# Patient Record
Sex: MALE | Race: WHITE | NOT HISPANIC OR LATINO | Employment: FULL TIME | URBAN - METROPOLITAN AREA
[De-identification: names, ages, dates, MRNs, and addresses within clinical notes are randomized per-mention and may not be internally consistent; named-entity substitution may affect disease eponyms.]

---

## 2022-12-21 ENCOUNTER — OFFICE VISIT (OUTPATIENT)
Dept: OTOLARYNGOLOGY | Facility: CLINIC | Age: 37
End: 2022-12-21
Payer: COMMERCIAL

## 2022-12-21 ENCOUNTER — PRE VISIT (OUTPATIENT)
Dept: OTOLARYNGOLOGY | Facility: CLINIC | Age: 37
End: 2022-12-21

## 2022-12-21 VITALS
HEIGHT: 68 IN | HEART RATE: 106 BPM | BODY MASS INDEX: 27.43 KG/M2 | SYSTOLIC BLOOD PRESSURE: 126 MMHG | RESPIRATION RATE: 16 BRPM | DIASTOLIC BLOOD PRESSURE: 86 MMHG | OXYGEN SATURATION: 96 % | WEIGHT: 181 LBS | TEMPERATURE: 99.9 F

## 2022-12-21 DIAGNOSIS — J03.90 TONSILLITIS: Primary | ICD-10-CM

## 2022-12-21 PROCEDURE — 99203 OFFICE O/P NEW LOW 30 MIN: CPT | Performed by: OTOLARYNGOLOGY

## 2022-12-21 RX ORDER — PREDNISONE 10 MG/1
40 TABLET ORAL DAILY
Qty: 20 TABLET | Refills: 0 | Status: SHIPPED | OUTPATIENT
Start: 2022-12-21 | End: 2022-12-26

## 2022-12-21 RX ORDER — AZITHROMYCIN 250 MG/1
TABLET, FILM COATED ORAL
COMMUNITY
Start: 2022-12-20 | End: 2023-10-24

## 2022-12-21 ASSESSMENT — PAIN SCALES - GENERAL: PAINLEVEL: MODERATE PAIN (5)

## 2022-12-21 NOTE — LETTER
"12/21/2022       RE: Jose Verdin  [address]  [facility] [address]     Dear Colleague,    Thank you for referring your patient, Jose Verdin, to the [facility] EAR NOSE AND THROAT CLINIC [place] [facility]. Please see a copy of my visit note below.    Went to  yesterday and is negative for Covid, flu, mono, strep but on a z-pack. Feverish at home the past few days but today is 99.5 oral; had 3 aleve this morning.    Neida Jimenez, [facility]                   New Patient Visit      Encounter date: December 21, 2022    Chief Complaint: sore throat, fever    History of Present Illness: Jose Verdin is a pleasant 37-year-old gentleman who I am seeing for sore throat.  He has had several days of fever, right greater than left throat pain, difficulty swallowing, and now nasal congestion.  He has little cough.  He was seen at urgent care yesterday and was negative for strep, mono, flu, and COVID.  He denies difficulty breathing.  No prior episodes of tonsillitis.  No mucopurulent discharge from the nose.  His son recently had flulike illness and is recovering from that currently.        Review of systems: A 14-point review of systems has been conducted and was negative for any notable symptoms, except as dictated in the history of present illness.     No past medical history on file.     No past surgical history on file.     No family history on file.     Social History     Socioeconomic History     Marital status:         Physical Exam:  Vital signs: /86 (BP Location: Right arm, Patient Position: Sitting, Cuff Size: Adult Regular)   Pulse 106   Temp 99.9  F (37.7  C) (Temporal)   Resp 16   Ht 1.727 m (5' 8\")   Wt 82.1 kg (181 lb)   SpO2 96%   BMI 27.52 kg/m     General Appearance: No acute distress, appropriate demeanor, conversant  Eyes: moist conjunctivae; EOMI; pupils " symmetric; visual acuity grossly intact; no proptosis  Head: normocephalic; overall symmetric appearance without deformity  Face: overall symmetric without deformity; HB I/VI  Ears: Normal appearance of external ear; external meatus normal in appearance; TMs intact without perforation bilaterally;   Nose: No external deformity  Oral Cavity/oropharynx: Normal appearance of mucosa; tongue midline; no mass or lesions; tonsils with severe erythema and exudate bilaterally; no evidence of peritonsillar abscess  Neck: RT>LT tender lymphadenopathy  Lungs: symmetric chest rise; no wheezing  CV: Good distal perfusion; normal heart rate  Extremities: No deformity  Neurologic Exam: Cranial nerves II-XII are grossly intact; no focal deficit      Laboratory Review:  n/a    Imaging Review:  n/a    Pathology Review:  n/a    Assessment/Medical Decision Making:  Likely acute tonsillitis    No evidence of PTA or deep space neck infection currently, but we discussed warning signs to watch out for (failure to improve, worsening symptoms (like shortness of breath, trouble swallowing, etc)      Plan:  Switch to augmentin  Prednisone burst  Return if no improement      Josue Nash MD    [facility]  Center for Skull Base and Pituitary Surgery  [facility]  Department of Otolaryngology - Head & Neck Surgery          Again, thank you for allowing me to participate in the care of your patient.      Sincerely,    Josue Nash MD

## 2022-12-21 NOTE — PATIENT INSTRUCTIONS
1. Please follow-up in clinic as needed.   Stop taking Azithromycin and start Augmentin.    Start Prednisone, 40 mg daily for 5 days.   2. Please call the ENT clinic with any questions,concerns, new or worsening symptoms.    -Clinic number is [phone]   - Radha's direct line (Dr. Nash's nurse) [phone]

## 2022-12-21 NOTE — LETTER
Date:December 22, 2022      Patient was self referred, no letter generated. Do not send.        [facility] Health Information

## 2022-12-21 NOTE — PROGRESS NOTES
Went to  yesterday and is negative for Covid, flu, mono, strep but on a z-pack. Feverish at home the past few days but today is 99.5 oral; had 3 aleve this morning.    Neida Jimenez, CMA

## 2022-12-21 NOTE — TELEPHONE ENCOUNTER
FUTURE VISIT INFORMATION      FUTURE VISIT INFORMATION:    Date: 12/21/22    Time: 10:15    Location[place] [facility]  REFERRAL INFORMATION:    Referring provider:      Referring providers clinic:      Reason for visit/diagnosis  Tonsillitis, swollen neck, sinusitis    RECORDS REQUESTED FROM:       Clinic name Comments Records Status Imaging Status   [facility] 12/20/22- note with Saravanan Judd Jr. PASTEPHANIE   Care everywhere

## 2022-12-21 NOTE — PROGRESS NOTES
"                   [facility]                   New Patient Visit      Encounter date: December 21, 2022    Chief Complaint: sore throat, fever    History of Present Illness: Jose Verdin is a pleasant 37-year-old gentleman who I am seeing for sore throat.  He has had several days of fever, right greater than left throat pain, difficulty swallowing, and now nasal congestion.  He has little cough.  He was seen at urgent care yesterday and was negative for strep, mono, flu, and COVID.  He denies difficulty breathing.  No prior episodes of tonsillitis.  No mucopurulent discharge from the nose.  His son recently had flulike illness and is recovering from that currently.        Review of systems: A 14-point review of systems has been conducted and was negative for any notable symptoms, except as dictated in the history of present illness.     No past medical history on file.     No past surgical history on file.     No family history on file.     Social History     Socioeconomic History     Marital status:         Physical Exam:  Vital signs: /86 (BP Location: Right arm, Patient Position: Sitting, Cuff Size: Adult Regular)   Pulse 106   Temp 99.9  F (37.7  C) (Temporal)   Resp 16   Ht 1.727 m (5' 8\")   Wt 82.1 kg (181 lb)   SpO2 96%   BMI 27.52 kg/m     General Appearance: No acute distress, appropriate demeanor, conversant  Eyes: moist conjunctivae; EOMI; pupils symmetric; visual acuity grossly intact; no proptosis  Head: normocephalic; overall symmetric appearance without deformity  Face: overall symmetric without deformity; HB I/VI  Ears: Normal appearance of external ear; external meatus normal in appearance; TMs intact without perforation bilaterally;   Nose: No external deformity  Oral Cavity/oropharynx: Normal appearance of mucosa; tongue midline; no mass or lesions; tonsils with severe erythema and exudate bilaterally; no evidence of peritonsillar abscess  Neck: RT>LT tender " lymphadenopathy  Lungs: symmetric chest rise; no wheezing  CV: Good distal perfusion; normal heart rate  Extremities: No deformity  Neurologic Exam: Cranial nerves II-XII are grossly intact; no focal deficit      Laboratory Review:  n/a    Imaging Review:  n/a    Pathology Review:  n/a    Assessment/Medical Decision Making:  Likely acute tonsillitis    No evidence of PTA or deep space neck infection currently, but we discussed warning signs to watch out for (failure to improve, worsening symptoms (like shortness of breath, trouble swallowing, etc)      Plan:  Switch to augmentin  Prednisone burst  Return if no improement      Josue Nash MD    [facility]  Center for Skull Base and Pituitary Surgery  [facility]  Department of Otolaryngology - Head & Neck Surgery

## 2023-10-06 ENCOUNTER — NURSE TRIAGE (OUTPATIENT)
Dept: NURSING | Facility: CLINIC | Age: 38
End: 2023-10-06
Payer: COMMERCIAL

## 2023-10-06 NOTE — TELEPHONE ENCOUNTER
History of perianal fistula/s (2). He notes that there is a possible new area with a new tract and a feels a new deep abscess forming    New forming abscess. Denies fevers or chills, No drainage. Pain started a few days ago, he thought this was his old fistula site but said he thinks there is a new area with pain. He has never been worked up for Crohn's, and patient reports his mother has Crohn's.    He believes he had a fistulotomy >10 years ago in [place] at [facility]. He is not certain which surgery this was but he for sure had surgery for his fistula.     He will see John Carrizales MD on 10/10 for assessment. Recommended with any fevers this weekend to go to [facility].

## 2023-10-06 NOTE — PROGRESS NOTES
Colon and Rectal Surgery Clinic Note    RE: Jose Verdin.  : 1985.  IBRAHIMA: 10/10/2023.    Reason for visit: perianal fistula.    HPI: Jose Verdin is a 38 year old male who presents today for a perianal fistula. He has a past medical history of HEMANT and congenital spondylolisthesis. He had a fistulotomy with Dr. Virgil Sesay in  in [place]. His mother has Crohn's disease.     Today Jose feels fine. He reports self stabbing a perianal abscess over the week end to get some relief from the pain. He has had multiple fistulas over his life time and chronic diarrhea.    Medical history:  Congenital spondylolisthesis   HEMANT    Surgical history:  No past surgical history on file.    Family history:  Mother with IBD    Medications:  Current Outpatient Medications   Medication Sig Dispense Refill    azithromycin (ZITHROMAX) 250 MG tablet  (Patient not taking: Reported on 10/10/2023)         Allergies:  No Known Allergies    Social history:   Social History     Tobacco Use    Smoking status: Never    Smokeless tobacco: Never   Substance Use Topics    Alcohol use: Not on file     Marital status: .    ROS:  A complete review of systems was performed with the patient and all systems negative except as per HPI.    Physical Examination:  Exam was chaperoned by Mariano De La Rosa, EMT-P  /82 (BP Location: Left arm, Patient Position: Sitting, Cuff Size: Adult Large)   Pulse 85   SpO2 96%   General: Well hydrated. No acute distress.  CV: RRR  Lung: Non-labored breathing on RA  Abdomen: Soft, NT. No inguinal adenopathy palpated.  Perianal external examination:  Perianal skin: abnormal - two left lateral and one right lateral external openings. No active infection.  Digital rectal examination: Was performed.   Sphincter tone: Good.  Palpable lesions: No.  Other: fistula tracks were appreciated on both sides of the anal canal.      Anoscopy: Was performed.   Hemorrhoids: No significant internal hemorrhoids.  Lesions:  No - mild inflammation, mostly localized on the left lateral aspect  Indication: diagnostic  Time spent: 5 min    ASSESSMENT    This is a 38 year old M with multiple perianal fistulas, chronic diarrhea, and a family history of Crohn's disease. A colonoscopy would be warranted before going to the OR for EUA. Risks, benefits, and alternatives of operative treatment were thoroughly discussed with the patient, he understands these well and agrees to proceed.    All pertinent labs and imaging were personally reviewed by me.      PLAN  - Colonoscopy first  - To OR for EUA and fistulotomy vs seton placement then  - Sitz baths for comfort  - Healthy life style and weight loss were encouraged    45 minutes spent on the date of the encounter doing chart review, history and exam, imaging review, documentation and further activities as noted above.      John Carrizales MD    Division of Colon and Rectal Surgery  [facility]    Referring Provider:  No referring provider defined for this encounter.     Primary Care Provider:  No primary care provider on file.

## 2023-10-06 NOTE — TELEPHONE ENCOUNTER
Diagnosis, Referred by & from: Perianal Fistula   Appt date: 10/10/2023   NOTES STATUS DETAILS   OFFICE NOTE from referring provider Internal [facility]:  10/6/23 - Noted in Nurse Triage   OFFICE NOTE from other specialist Care Everywhere [facility]:  11/1/12 - GEN SURG OV with Dr. Sesay  4/20/21 - PCC OV with Dr. Dumont   DISCHARGE SUMMARY from hospital N/A    DISCHARGE REPORT from the ER Care Everywhere [facility]:  10/27/12 - ED OV with Dr. Wachter   OPERATIVE REPORT Care Everywhere [facility]:  12/14/12 - OP Note for FISTULOTOMY with Dr. Sesay   MEDICATION LIST Internal    LABS N/A    DIAGNOSTIC PROCEDURES N/A    IMAGING (DISC & REPORT) N/A

## 2023-10-06 NOTE — TELEPHONE ENCOUNTER
Nurse Triage SBAR    Is this a 2nd Level Triage? YES, LICENSED PRACTITIONER REVIEW IS REQUIRED    Situation: RECTAL PAIN> spoke with Alexys Garcia in background answering questions.      SYMPTOM: rectal pain without fever, bleeding nor constipation x 1-2 days, worse this AM, awoke at night.    - MODERATE (4-7): interferes with normal activities or awakens from sleep, limping      Can't sit without pain  Background:     PMH:  .Details unclear- both fistula and fissure queried, did have surgery of some kind > 10 years ago. Denies IBD, mom has Crohns)    Assessment:    Moderate- severe rectal pain without fever, constipation or rectal bleeding x 2 days, awoke from sleep, cannot sit without pain. Previous distant history of similar issue? (Details unclear)    Protocol Recommended Disposition: See in office. He is new patient, moved from [place] in past 2 years, has not established with provider. Wife works at [employer].  Recommended sitz baths also, fiber, observe for fever, bleeding per rectum, increased in pain            Routed to provider team    Does the patient meet one of the following criteria for ADS visit consideration? No    Reason for Disposition   MODERATE-SEVERE rectal pain (i.e., interferes with school, work, or sleep)    Additional Information   Negative: Sounds like a life-threatening emergency to the triager   Negative: Diarrhea is main symptom   Negative: Constipation is main symptom (e.g., pain or discomfort caused by passage of hard BMs)   Negative: Blood in or on bowel movement is main symptom   Negative: Sexual assault or rape (sexual intercourse or activity occurs without freely given consent), known or suspected   Negative: Injury to rectum   Negative: Patient sounds very sick or weak to the triager   Negative: Rectal pain or redness and fever > 100.4 F (38.0 C)   Negative: Acute onset rectal pain and constipation (straining with rectal pressure or fullness), which is not relieved by Sitz bath or  "suppository    Answer Assessment - Initial Assessment Questions  1. SYMPTOM: rectal pain without bleeding  2. ONSET:  1-2 days       3. RECTAL PAIN: \"Do you have any pain around your rectum?\" \"How bad is the pain?\"         - MODERATE (4-7): interferes with normal activities or awakens from sleep, limping      Can't sit without pain  4. RECTAL ITCHING: \"Do you have any itching in this area?\" \"How bad is the itching?\"      - MILD: doesn't interfere with normal activities         5. CONSTIPATION: Not constipated, regular soft BM  6. CAUSE: \"What do you think is causing the anus symptoms?\"       Previous fistula or fissure- surgery in distant past> 10 years  7. OTHER SYMPTOMS: No    Protocols used: Rectal Symptoms-A-[place]    "

## 2023-10-10 ENCOUNTER — TELEPHONE (OUTPATIENT)
Dept: GASTROENTEROLOGY | Facility: CLINIC | Age: 38
End: 2023-10-10

## 2023-10-10 ENCOUNTER — TELEPHONE (OUTPATIENT)
Dept: SURGERY | Facility: CLINIC | Age: 38
End: 2023-10-10

## 2023-10-10 ENCOUNTER — PRE VISIT (OUTPATIENT)
Dept: SURGERY | Facility: CLINIC | Age: 38
End: 2023-10-10

## 2023-10-10 ENCOUNTER — OFFICE VISIT (OUTPATIENT)
Dept: SURGERY | Facility: CLINIC | Age: 38
End: 2023-10-10
Payer: COMMERCIAL

## 2023-10-10 ENCOUNTER — HOSPITAL ENCOUNTER (OUTPATIENT)
Facility: AMBULATORY SURGERY CENTER | Age: 38
End: 2023-10-10
Attending: SURGERY | Admitting: SURGERY
Payer: COMMERCIAL

## 2023-10-10 VITALS — SYSTOLIC BLOOD PRESSURE: 122 MMHG | OXYGEN SATURATION: 96 % | DIASTOLIC BLOOD PRESSURE: 82 MMHG | HEART RATE: 85 BPM

## 2023-10-10 DIAGNOSIS — K60.30 ANAL FISTULA: Primary | ICD-10-CM

## 2023-10-10 DIAGNOSIS — R19.7 DIARRHEA, UNSPECIFIED TYPE: ICD-10-CM

## 2023-10-10 PROCEDURE — 99204 OFFICE O/P NEW MOD 45 MIN: CPT | Mod: 25 | Performed by: SURGERY

## 2023-10-10 PROCEDURE — 46600 DIAGNOSTIC ANOSCOPY SPX: CPT | Performed by: SURGERY

## 2023-10-10 ASSESSMENT — ENCOUNTER SYMPTOMS
DIARRHEA: 1
ABDOMINAL PAIN: 0
NAUSEA: 0
HEARTBURN: 0
BLOATING: 0
JAUNDICE: 0
VOMITING: 0
RECTAL PAIN: 1
BLOOD IN STOOL: 0
BOWEL INCONTINENCE: 0
CONSTIPATION: 0

## 2023-10-10 ASSESSMENT — PAIN SCALES - GENERAL: PAINLEVEL: MILD PAIN (2)

## 2023-10-10 NOTE — NURSING NOTE
Chief Complaint   Patient presents with    Consult       Vitals:    10/10/23 1057   BP: 122/82   BP Location: Left arm   Patient Position: Sitting   Cuff Size: Adult Large   Pulse: 85   SpO2: 96%       There is no height or weight on file to calculate BMI.    Mariano De La Rosa EMT-P

## 2023-10-10 NOTE — CONFIDENTIAL NOTE
On 10/10/2023 at 1:18 PM:  Spoke with patient about scheduling outpatient surgery with Dr. John Carrizales. Patient 1st needs to schedule and complete a Colonoscopy. Connected patient with Endoscopy Scheduling.    On 10/10/2023 at 1:41 PM:    Patient is scheduled for surgery with Dr. John Carrizales     Spoke with: Jose    Date of Surgery: 11/15/2023    Location: [facility] OR    Informed patient they will need an adult  YES    Pre op with Provider Dr. John Carrizales     H&P: Scheduled with PAC Visit on 10/24/23 at 10:15 AM    Post-op with Carly Nassar NP on 12/5/2023 at 10:00 AM    Surgery packet: sending via [employer] and Mail    Additional Info: Colonoscopy scheduled on 10/25/2023 at 8:45 AM    Maida Jones  [place]-op Coordinator  [facility]  Direct Phone: [phone]     Maida Jones  Dania-op Coordinator  [facility]  Direct Phone: [phone]

## 2023-10-10 NOTE — TELEPHONE ENCOUNTER
"Endoscopy Scheduling Screen    Have you had a positive Covid test in the last 14 days?  No      Are you active on MyChart?   Yes      What insurance is in the chart?  Other:  [employer]      Ordering/Referring Provider: LASHAY   (If ordering provider performs procedure, schedule with ordering provider unless otherwise instructed. )    BMI: Estimated body mass index is 27.52 kg/m  as calculated from the following:    Height as of 12/21/22: 1.727 m (5' 8\").    Weight as of 12/21/22: 82.1 kg (181 lb).     Sedation   Ordered  moderate sedation.   If patient BMI > 50 do not schedule in [facility].    If patient BMI > 45 do not schedule at [facility].    Are you taking methadone or Suboxone?  No      Are you taking any prescription medications for pain 3 or more times per week?   No      Do you have a history of malignant hyperthermia or adverse reaction to anesthesia?  No      (Females) Are you currently pregnant?   No       Have you been diagnosed or told you have pulmonary hypertension?   No      Do you have an LVAD?  No      Have you been told you have moderate to severe sleep apnea?  No      Have you been told you have COPD, asthma, or any other lung disease?  No      Do you have any heart conditions?  No       Have you ever had an organ transplant?   No      Have you ever had or are you awaiting a heart or lung transplant?   No    Have you had a stroke or transient ischemic attack (TIA aka \"mini stroke\" in the last 6 months?   No      Have you been diagnosed with or been told you have cirrhosis of the liver?   No      Are you currently on dialysis?   No      Do you need assistance transferring?   No    BMI: Estimated body mass index is 27.52 kg/m  as calculated from the following:    Height as of 12/21/22: 1.727 m (5' 8\").    Weight as of 12/21/22: 82.1 kg (181 lb).       Is patients BMI > 40 and scheduling location [facility]?  No      Do you take an injectable medication for weight loss or diabetes (excluding insulin)?  No      Do you " take the medication Naltrexone?  No      Do you take blood thinners?  No       Prep   Are you currently on dialysis or do you have chronic kidney disease?  No      Do you have a diagnosis of diabetes?  No      Do you have a diagnosis of cystic fibrosis (CF)?  No      On a regular basis do you go 3 -5 days between bowel movements?  No      BMI > 40?  No      Preferred Pharmacy:    [id number] - [place], [place] - [address] [place] [address]  & [place] [address]  [address] [place] [address]  [place] [address]  Phone: [phone] Fax: [phone]      Final Scheduling Details   Colonoscopy prep sent?  Standard MiraLAX      Procedure scheduled  Colonoscopy      Surgeon:  ROMAN     Date of procedure:  10/25     Pre-OP / PAC:   No - Not required for this site.    Location  [facility]  1ST AVAILABLE    Sedation   Moderate Sedation - Per order.      Patient Reminders:   You will receive a call from a Nurse to review instructions and health history.  This assessment must be completed prior to your procedure.  Failure to complete the Nurse assessment may result in the procedure being cancelled.      On the day of your procedure, please designate an adult(s) who can drive you home stay with you for the next 24 hours. The medicines used in the exam will make you sleepy. You will not be able to drive.      You cannot take public transportation, ride share services, or non-medical taxi service without a responsible caregiver.  Medical transport services are allowed with the requirement that a responsible caregiver will receive you at your destination.  We require that drivers and caregivers are confirmed prior to your procedure.

## 2023-10-10 NOTE — TELEPHONE ENCOUNTER
FUTURE VISIT INFORMATION      SURGERY INFORMATION:  Date: 10/25/23  Location:  [facility]  Surgeon:  Benjie Bustamante MD   Anesthesia Type:  Moderate Sedation   Procedure: Colonoscopy     RECORDS REQUESTED FROM:       Primary Care Provider: Chad

## 2023-10-10 NOTE — LETTER
10/10/2023       RE: Jose Verdin  [address]  [facility] [address]     Dear Colleague,    Thank you for referring your patient, Jose Verdin, to the [facility] COLON AND RECTAL SURGERY CLINIC [place] [facility]. Please see a copy of my visit note below.    Colon and Rectal Surgery Clinic Note    RE: Jose Verdin.  : 1985.  IBRAHIMA: 10/10/2023.    Reason for visit: perianal fistula.    HPI: Jose Verdin is a 38 year old male who presents today for a perianal fistula. He has a past medical history of HEMANT and congenital spondylolisthesis. He had a fistulotomy with Dr. Virgil Sesay in  in [place]. His mother has Crohn's disease.     Today Jose feels fine. He reports self stabbing a perianal abscess over the week end to get some relief from the pain. He has had multiple fistulas over his life time and chronic diarrhea.    Medical history:  Congenital spondylolisthesis   HEMANT    Surgical history:  No past surgical history on file.    Family history:  Mother with IBD    Medications:  Current Outpatient Medications   Medication Sig Dispense Refill     azithromycin (ZITHROMAX) 250 MG tablet  (Patient not taking: Reported on 10/10/2023)         Allergies:  No Known Allergies    Social history:   Social History     Tobacco Use     Smoking status: Never     Smokeless tobacco: Never   Substance Use Topics     Alcohol use: Not on file     Marital status: .    ROS:  A complete review of systems was performed with the patient and all systems negative except as per HPI.    Physical Examination:  Exam was chaperoned by Mariano De La Rosa, EMT-P  /82 (BP Location: Left arm, Patient Position: Sitting, Cuff Size: Adult Large)   Pulse 85   SpO2 96%   General: Well hydrated. No acute distress.  CV: RRR  Lung: Non-labored breathing on RA  Abdomen: Soft, NT. No inguinal adenopathy palpated.  Perianal external examination:  Perianal skin: abnormal -  two left lateral and one right lateral external openings. No active infection.  Digital rectal examination: Was performed.   Sphincter tone: Good.  Palpable lesions: No.  Other: fistula tracks were appreciated on both sides of the anal canal.      Anoscopy: Was performed.   Hemorrhoids: No significant internal hemorrhoids.  Lesions: No - mild inflammation, mostly localized on the left lateral aspect  Indication: diagnostic  Time spent: 5 min    ASSESSMENT    This is a 38 year old M with multiple perianal fistulas, chronic diarrhea, and a family history of Crohn's disease. A colonoscopy would be warranted before going to the OR for EUA. Risks, benefits, and alternatives of operative treatment were thoroughly discussed with the patient, he understands these well and agrees to proceed.    All pertinent labs and imaging were personally reviewed by me.      PLAN  - Colonoscopy first  - To OR for EUA and fistulotomy vs seton placement then  - Sitz baths for comfort  - Healthy life style and weight loss were encouraged    45 minutes spent on the date of the encounter doing chart review, history and exam, imaging review, documentation and further activities as noted above.      John Carrizales MD    Division of Colon and Rectal Surgery  [facility]    Referring Provider:  No referring provider defined for this encounter.     Primary Care Provider:  No primary care provider on file.      Again, thank you for allowing me to participate in the care of your patient.      Sincerely,    John Carrizales MD

## 2023-10-11 ENCOUNTER — TELEPHONE (OUTPATIENT)
Dept: GASTROENTEROLOGY | Facility: CLINIC | Age: 38
End: 2023-10-11
Payer: COMMERCIAL

## 2023-10-11 NOTE — TELEPHONE ENCOUNTER
Attempted to contact patient in order to complete pre assessment questions.     Pt answered but was unable to talk at this time. Writer provided pt with our call back number as pt will call back when he has time.     Erendira Houston RN  Endoscopy Procedure Pre Assessment RN

## 2023-10-11 NOTE — TELEPHONE ENCOUNTER
Pre visit planning completed.      Procedure details:    Patient scheduled for Colonoscopy  on 10/25/23.     Arrival time: 0745. Procedure time 0845    Pre op exam needed? N/A    Facility location: [facility]; [address] [place] [address], [address] Floor, [place] [address]    Sedation type: Conscious sedation     Indication for procedure: Anal fistula      Chart review:     Electronic implanted devices? No    Diabetic? No    Diabetic medication HOLDING recommendations: (if applicable)  Oral diabetic medications: N/A  Diabetic injectables: N/A  Insulin: N/A      Medication review:    Anticoagulants? No    NSAIDS? No NSAID medications per patient's medication list.  RN will verify with pre-assessment call.    Other medication HOLDING recommendations:  N/A      Prep for procedure:     Bowel prep recommendation: Standard Miralax   Due to:  standard bowel prep.    Prep instructions sent via gee Nguyen RN  Endoscopy Procedure Pre Assessment RN  [phone] option 4

## 2023-10-16 NOTE — TELEPHONE ENCOUNTER
Pre assessment completed for upcoming procedure.      Procedure details:    Patient scheduled for Colonoscopy  on 10.25.23.     Arrival time: 0745. Procedure time 0845    Pre op exam needed? N/A    Facility location: [facility]; [address], [address]rd [address], [place] [address]    Sedation type: Conscious sedation     Indication for procedure: anal fistula    COVID policy reviewed.    Designated  policy reviewed. Instructed to have someone stay 6 hours post procedure.       Chart review:     Electronic implanted devices? No    Diabetic? No    Medication review:    Anticoagulants? No    NSAIDS? No    Other medication HOLDING recommendations:  N/A      Prep for procedure:     Bowel prep recommendation: Standard Miralax  Due to: standard bowel prep.    Prep instructions sent via [employer] Reviewed procedure prep instructions.     Patient verbalized understanding and had no questions or concerns at this time.        Yue De Anda RN  Endoscopy Procedure Pre Assessment RN  [phone] option 4

## 2023-10-22 ENCOUNTER — HEALTH MAINTENANCE LETTER (OUTPATIENT)
Age: 38
End: 2023-10-22

## 2023-10-24 ENCOUNTER — VIRTUAL VISIT (OUTPATIENT)
Dept: SURGERY | Facility: CLINIC | Age: 38
End: 2023-10-24
Payer: COMMERCIAL

## 2023-10-24 ENCOUNTER — PRE VISIT (OUTPATIENT)
Dept: SURGERY | Facility: CLINIC | Age: 38
End: 2023-10-24

## 2023-10-24 ENCOUNTER — ANESTHESIA EVENT (OUTPATIENT)
Dept: SURGERY | Facility: CLINIC | Age: 38
End: 2023-10-24

## 2023-10-24 DIAGNOSIS — Z01.818 PREOP EXAMINATION: Primary | ICD-10-CM

## 2023-10-24 DIAGNOSIS — K60.30 ANAL FISTULA: ICD-10-CM

## 2023-10-24 PROCEDURE — 99202 OFFICE O/P NEW SF 15 MIN: CPT | Mod: VID | Performed by: PHYSICIAN ASSISTANT

## 2023-10-24 ASSESSMENT — LIFESTYLE VARIABLES: TOBACCO_USE: 1

## 2023-10-24 ASSESSMENT — PAIN SCALES - GENERAL: PAINLEVEL: NO PAIN (0)

## 2023-10-24 NOTE — H&P
Pre-Operative H & P     CC:  Preoperative exam to assess for increased cardiopulmonary risk while undergoing surgery and anesthesia.    Date of Encounter: 10/24/2023  Primary Care Physician:  Meli South Chi     Reason for visit: No diagnosis found.    HPI  Jose Verdin is a 38 year old male who presents for pre-operative H & P in preparation for  Procedure Information       Case: [id number] Date/Time: 11/15/23 1355    Procedures:       exam under anesthesia, fistulotomy, possible seton placement (Anus)      POSSIBLE PLACEMENT OF SETON RECTUM (Anus)    Anesthesia type: MAC    Diagnosis: Anal fistula [K60.3]    Pre-op diagnosis: Anal fistula [K60.3]    Location: [place] [address] [facility]    Providers: John Carrizales MD            Mr. Verdin  has a pMH significant for lumbar spondylolisthesis and HEMANT. He was evaluated by Dr. Carrizales for a perianal fistula 10/10/23. The above procedure is now planned    History is obtained from the patient and chart review    Hx of abnormal bleeding or anti-platelet use: denies      Past Medical History  Past Medical History:   Diagnosis Date    HEMANT (obstructive sleep apnea)     Spondylolisthesis of lumbar region        Past Surgical History  Past Surgical History:   Procedure Laterality Date    FISTULOTOMY RECTUM         Prior to Admission Medications  No current outpatient medications on file.       Allergies  No Known Allergies    Social History  Social History     Socioeconomic History    Marital status:      Spouse name: Not on file    Number of children: Not on file    Years of education: Not on file    Highest education level: Not on file   Occupational History    Not on file   Tobacco Use    Smoking status: Former     Types: Cigarettes     Quit date: 2019     Years since quittin.8    Smokeless tobacco: Never   Substance and Sexual Activity    Alcohol use: Never    Drug use: Yes     Types: Marijuana      Comment: uses marijuana 1x a week    Sexual activity: Not on file   Other Topics Concern    Not on file   Social History Narrative    Not on file     Social Determinants of Health     Financial Resource Strain: Not on file   Food Insecurity: Not on file   Transportation Needs: Not on file   Physical Activity: Not on file   Stress: Not on file   Social Connections: Not on file   Interpersonal Safety: Not on file   Housing Stability: Not on file       Family History  Family History   Problem Relation Age of Onset    Anesthesia Reaction No family hx of     Venous thrombosis No family hx of        Review of Systems  The complete review of systems is negative other than noted in the HPI or here.   Anesthesia Evaluation   Pt has had prior anesthetic.     History of anesthetic complications   HEMANT.    ROS/MED HX  ENT/Pulmonary:     (+) sleep apnea, doesn't use CPAP,              tobacco use, Past use,                      Neurologic:       Cardiovascular:  - neg cardiovascular ROS     METS/Exercise Tolerance: >4 METS    Hematologic:  - neg hematologic  ROS  (-) history of blood clots and history of blood transfusion   Musculoskeletal: Comment: Lumbar spondylolisthesis        GI/Hepatic:  - neg GI/hepatic ROS     Renal/Genitourinary:  - neg Renal ROS     Endo:  - neg endo ROS     Psychiatric/Substance Use:       Infectious Disease:  - neg infectious disease ROS     Malignancy:  - neg malignancy ROS     Other:            Virtual visit -  No vitals were obtained    Physical Exam  Constitutional: Awake, alert, cooperative, no apparent distress, and appears stated age.  HENT: Normocephalic  Respiratory: non labored breathing   Neurologic: Awake, alert, oriented to name, place and time.   Neuropsychiatric: Calm, cooperative. Normal affect.      Prior Labs/Diagnostic Studies   All labs and imaging personally reviewed     Labs not indicated    EKG/ stress test - if available please see in ROS above           The patient's records  "and results personally reviewed by this provider.     Outside records reviewed from: Care Everywhere      Assessment    Jose Verdin is a 38 year old male seen as a PAC referral for risk assessment and optimization for anesthesia.    Plan/Recommendations  Pt will be optimized for the proposed procedure.  See below for details on the assessment, risk, and preoperative recommendations    NEUROLOGY  - No history of TIA, CVA or seizure    -Post Op delirium risk factors:  No risk identified    ENT  - No current airway concerns.  Will need to be reassessed day of surgery.  Mallampati: Unable to assess  TM: Unable to assess    CARDIAC  - No history of CAD, Hypertension, and Afib  - METS (Metabolic Equivalents)  Patient performs 4 or more METS exercise without symptoms            Total Score: 0      RCRI-Very low risk: Class 1 0.4% complication rate            Total Score: 0        PULMONARY  - Obstructive Sleep Apnea  HEMANT without home CPAP use.  - pt reports he used to use CPAP but HEMANT was not every truly diagnosed. He no longer uses CPAP.     - Denies asthma or inhaler use  - Tobacco History    History   Smoking Status    Former    Types: Cigarettes    Quit date: 2019   Smokeless Tobacco    Never       GI  - denies GERD  PONV Low Risk  Total Score: 1           1 AN PONV: Patient is not a current smoker        ENDOCRINE    - BMI: Estimated body mass index is 27.52 kg/m  as calculated from the following:    Height as of 12/21/22: 1.727 m (5' 8\").    Weight as of 12/21/22: 82.1 kg (181 lb).  Overweight (BMI 25.0-29.9)  - No history of Diabetes Mellitus    HEME  VTE Low Risk 0.5%            Total Score: 2    VTE: Male      - No history of abnormal bleeding or antiplatelet use.        Different anesthesia methods/types have been discussed with the patient, but they are aware that the final plan will be decided by the assigned anesthesia provider on the date of service.        The patient is optimized for their procedure. " AVS with information on surgery time/arrival time, meds and NPO status given by nursing staff. No further diagnostic testing indicated.    Please refer to the physical examination documented by the anesthesiologist in the anesthesia record on the day of surgery.    Video-Visit Details    Type of service:  Video Visit    Provider received verbal consent for a Video Visit from the patient? Yes     Originating Location (pt. Location): Other at work    Distant Location (provider location):  Off-site  Mode of Communication:  Video Conference via [employer]  On the day of service:     Prep time: 10 minutes  Visit time: 5 minutes  Documentation time: 10 minutes  ------------------------------------------  Total time: 25 minutes      Ary Cunningham PA-C  Preoperative Assessment Center  [facility]  Clinic and Surgery Center  Phone: [phone]  Fax: [phone]

## 2023-10-24 NOTE — PATIENT INSTRUCTIONS
Preparing for Your Surgery      Name:  Jose Verdin   MRN:  [id number]   :  1985   Today's Date:  10/24/2023         Arriving for surgery:  Surgery date:  11/15/23  Arrival time:  12:25 pm  Procedure time: 1:55 pm    Restrictions due to COVID 19:    Please maintain social distance.  Masking is optional        parking is available for anyone with mobility limitations or disabilities. (Monday- Friday 7 am- 5 pm)    Please come to:    [facility]  [address] [place] [phone]    Check in on the 5th floor, Ambulatory Surgery Center.    What can I eat or drink?    -  You may eat and drink normally until 8 hours before arrival time  (Until 4:25 am)  -  You may have clear liquids until 2 hours before arrival time  (Until 10:25 am)    Examples of clear liquids:  Water  Clear broth  Juices (apple, white grape, white cranberry  and cider) without pulp  Noncarbonated, powder based beverages  (lemonade and Jose-Aid)  Sodas (Sprite, 7-Up, ginger ale and seltzer)  Coffee or tea (without milk or cream)  Gatorade    --No alcohol or cannabis products for at least 24 hours before surgery    Which medicines can I take?    Hold Aspirin for 7 days before surgery.   Hold Multivitamins for 7 days before surgery.  Hold Supplements for 7 days before surgery.  Hold Ibuprofen (Advil, Motrin) for 1 day before surgery--unless otherwise directed by surgeon.  Hold Naproxen (Aleve) for 4 days before surgery.    -  DO NOT take the following medications the day of surgery:  Not applicable    -  PLEASE TAKE the following medications the day of surgery:  Two Fleets enemas - see below;    Bowel Preparation:  FLEET ENEMA INSTRUCTIONS     2 hours prior to your arrival time for procedure:     BOWEL PREP: FLEET ENEMA INSTRUCTIONS     Purchase 2 Fleet enemas over the counter at any local pharmacy or store.  There is no prescription required.  Start your prep 2 hours prior to your appointment check-in  time.     2 Hours Prior to arrival for Your Procedure or Surgery     1. Remove orange protective shield from enema Comfortip before inserting.     2. With steady pressure, gently insert enema tip into rectum with a slight side-to-side movement, with tip pointing toward the navel.  Insertion may be easier if you bear down, as if you are having a bowel movement.     3. Do not force the enema tip into rectum, as this can cause injury.     4. squeeze bottle until nearly all liquid is gone.  It is not neccessary to empty the bottle      5. Remove Comforttip from rectum, and maintain position until you feel the urge to evacuate is strong (usually 2-5 minutes).       Repeat the same steps for the second enema 30 minutes after completing the first enema.      For any questions or concerns, please contact our care coordinators- Jeanie RN: [phone] or Alicia RN: [phone]        For any medical questions or concerns regarding medications, bowel prep, or FMLA paperwork, please contact:     Jeanie RN: [phone]     For any scheduling related questions, please contact:     Surgery Scheduling Line ([place]): [phone]      Thank you!        How do I prepare myself?  - Please take 2 showers (one the night prior to surgery and one the morning of surgery) using Scrubcare or Hibiclens soap.    Use this soap only from the neck to your toes.     Leave the soap on your skin for one minute--then rinse thoroughly.      You may use your own shampoo and conditioner; no other hair products.   - Please remove all jewelry and body piercings.  - No lotions, deodorants or fragrance.  - No makeup or fingernail polish.   - Bring your ID and insurance card.    -If you have a Deep Brain Stimulator, a Spinal Cord Stimulator or any implanted Neuro device you must bring the remote to the Surgery Center          ALL PATIENTS ARE REQUIRED TO HAVE A RESPONSIBLE ADULT TO DRIVE AND BE IN ATTENDANCE WITH THEM FOR 24 HOURS FOLLOWING SURGERY        Covid testing policy as of 12/06/2022  Your surgeon will notify and schedule you for a COVID test if one is needed before surgery--please direct any questions or COVID symptoms to your surgeon      Questions or Concerns:    -For questions regarding the day of surgery please contact the Ambulatory Surgery Center at [phone].    -If you have health changes between today and your surgery please contact your surgeon.     For questions after surgery please call your surgeons office.

## 2023-10-24 NOTE — PROGRESS NOTES
Jose is a 38 year old who is being evaluated via a billable video visit.      How would you like to obtain your AVS? MyChart  If the video visit is dropped, the invitation should be resent by: Text to cell phone: [phone]        HPI                 Review of Systems                 Physical Exam

## 2023-10-25 ENCOUNTER — HOSPITAL ENCOUNTER (OUTPATIENT)
Facility: CLINIC | Age: 38
Discharge: HOME | End: 2023-10-25
Attending: INTERNAL MEDICINE | Admitting: INTERNAL MEDICINE
Payer: COMMERCIAL

## 2023-10-25 VITALS
HEART RATE: 66 BPM | DIASTOLIC BLOOD PRESSURE: 68 MMHG | RESPIRATION RATE: 16 BRPM | OXYGEN SATURATION: 94 % | SYSTOLIC BLOOD PRESSURE: 91 MMHG

## 2023-10-25 LAB — COLONOSCOPY: NORMAL

## 2023-10-25 PROCEDURE — 99153 MOD SED SAME PHYS/QHP EA: CPT | Performed by: INTERNAL MEDICINE

## 2023-10-25 PROCEDURE — 88305 TISSUE EXAM BY PATHOLOGIST: CPT | Mod: TC | Performed by: INTERNAL MEDICINE

## 2023-10-25 PROCEDURE — 45380 COLONOSCOPY AND BIOPSY: CPT | Performed by: INTERNAL MEDICINE

## 2023-10-25 PROCEDURE — 250N000011 HC RX IP 250 OP 636: Performed by: INTERNAL MEDICINE

## 2023-10-25 PROCEDURE — G0500 MOD SEDAT ENDO SERVICE >5YRS: HCPCS | Performed by: INTERNAL MEDICINE

## 2023-10-25 PROCEDURE — 88305 TISSUE EXAM BY PATHOLOGIST: CPT | Mod: 26 | Performed by: PATHOLOGY

## 2023-10-25 RX ORDER — FLUMAZENIL 0.1 MG/ML
0.2 INJECTION, SOLUTION INTRAVENOUS
Status: CANCELLED | OUTPATIENT
Start: 2023-10-25 | End: 2023-10-25

## 2023-10-25 RX ORDER — NALOXONE HYDROCHLORIDE 0.4 MG/ML
0.2 INJECTION, SOLUTION INTRAMUSCULAR; INTRAVENOUS; SUBCUTANEOUS
Status: CANCELLED | OUTPATIENT
Start: 2023-10-25

## 2023-10-25 RX ORDER — ONDANSETRON 4 MG/1
4 TABLET, ORALLY DISINTEGRATING ORAL EVERY 6 HOURS PRN
Status: CANCELLED | OUTPATIENT
Start: 2023-10-25

## 2023-10-25 RX ORDER — PROCHLORPERAZINE MALEATE 10 MG
10 TABLET ORAL EVERY 6 HOURS PRN
Status: CANCELLED | OUTPATIENT
Start: 2023-10-25

## 2023-10-25 RX ORDER — LIDOCAINE 40 MG/G
CREAM TOPICAL
Status: DISCONTINUED | OUTPATIENT
Start: 2023-10-25 | End: 2023-10-25 | Stop reason: HOSPADM

## 2023-10-25 RX ORDER — ONDANSETRON 2 MG/ML
4 INJECTION INTRAMUSCULAR; INTRAVENOUS EVERY 6 HOURS PRN
Status: CANCELLED | OUTPATIENT
Start: 2023-10-25

## 2023-10-25 RX ORDER — NALOXONE HYDROCHLORIDE 0.4 MG/ML
0.4 INJECTION, SOLUTION INTRAMUSCULAR; INTRAVENOUS; SUBCUTANEOUS
Status: CANCELLED | OUTPATIENT
Start: 2023-10-25

## 2023-10-25 RX ORDER — ONDANSETRON 2 MG/ML
4 INJECTION INTRAMUSCULAR; INTRAVENOUS
Status: DISCONTINUED | OUTPATIENT
Start: 2023-10-25 | End: 2023-10-25 | Stop reason: HOSPADM

## 2023-10-25 RX ORDER — FENTANYL CITRATE 50 UG/ML
INJECTION, SOLUTION INTRAMUSCULAR; INTRAVENOUS PRN
Status: DISCONTINUED | OUTPATIENT
Start: 2023-10-25 | End: 2023-10-25 | Stop reason: HOSPADM

## 2023-10-25 ASSESSMENT — ACTIVITIES OF DAILY LIVING (ADL): ADLS_ACUITY_SCORE: 35

## 2023-10-25 NOTE — H&P
See H+P in Epic 10/24/23    JASMIN Bustamante MD  Professor of Medicine  Division of Gastroenterology, Hepatology and Nutrition  [facility]

## 2023-10-26 ENCOUNTER — PATIENT OUTREACH (OUTPATIENT)
Dept: SURGERY | Facility: CLINIC | Age: 38
End: 2023-10-26
Payer: COMMERCIAL

## 2023-10-26 DIAGNOSIS — Z83.79 FAMILY HISTORY OF CROHN'S DISEASE: Primary | ICD-10-CM

## 2023-10-26 LAB
PATH REPORT.COMMENTS IMP SPEC: NORMAL
PATH REPORT.FINAL DX SPEC: NORMAL
PATH REPORT.GROSS SPEC: NORMAL
PATH REPORT.MICROSCOPIC SPEC OTHER STN: NORMAL
PATH REPORT.RELEVANT HX SPEC: NORMAL
PHOTO IMAGE: NORMAL

## 2023-11-01 ENCOUNTER — VIRTUAL VISIT (OUTPATIENT)
Dept: GASTROENTEROLOGY | Facility: CLINIC | Age: 38
End: 2023-11-01
Attending: INTERNAL MEDICINE
Payer: COMMERCIAL

## 2023-11-01 VITALS — HEIGHT: 68 IN | BODY MASS INDEX: 27.28 KG/M2 | WEIGHT: 180 LBS

## 2023-11-01 DIAGNOSIS — K60.30 ANAL FISTULA: Primary | ICD-10-CM

## 2023-11-01 DIAGNOSIS — Z83.79 FAMILY HISTORY OF CROHN'S DISEASE: ICD-10-CM

## 2023-11-01 DIAGNOSIS — K52.9 COLITIS: ICD-10-CM

## 2023-11-01 PROCEDURE — 99214 OFFICE O/P EST MOD 30 MIN: CPT | Mod: VID | Performed by: INTERNAL MEDICINE

## 2023-11-01 ASSESSMENT — PAIN SCALES - GENERAL: PAINLEVEL: NO PAIN (0)

## 2023-11-01 NOTE — PROGRESS NOTES
HPI:    Jose  presents today for a video visit to discuss likely crohns.  Began struggling with levi-rectal abscesses and fistulas about 20 years ago - has had ~ 3-4 episodes since. Had his first colonoscopy last week with Dr. Bustamante - normal TI and right colon with some patches of ulceration/inflammation in the descending, sigmoid and rectum. Biopsies with patchy chronic inflammation in the rectum and descending/sigmoid colon.    Occasional diarrhea - generally has a few bowel movements in the morning. No blood in the stool. No abdominal pain    Family history of crohns in his mother. Thinks she had surgery along time ago and hasn't been on any medications since      Past Medical History:   Diagnosis Date    HEMANT (obstructive sleep apnea)     Spondylolisthesis of lumbar region        Past Surgical History:   Procedure Laterality Date    COLONOSCOPY N/A 10/25/2023    Procedure: COLONOSCOPY, WITH POLYPECTOMY AND BIOPSY;  Surgeon: Benjie Bustamante MD;  Location: [facility]    FISTULOTOMY RECTUM         Family History   Problem Relation Age of Onset    Anesthesia Reaction No family hx of     Venous thrombosis No family hx of        Social History     Tobacco Use    Smoking status: Former     Types: Cigarettes     Quit date: 2019     Years since quittin.8    Smokeless tobacco: Never   Substance Use Topics    Alcohol use: Never        O:    Gen: no acute distress  HEENT: NCAT  Neck: normal ROM  Resp: nonlabored breathing  Neuro: no gross deficits  Psych: appropriate mood and affect    Assessment and Plan:    # concern for crohns colitis/perianal disease - is relatively asymptomatic at present but given family history, recurrent anal fistula/abscess and patchy chronic colitis on colonoscopy is certainly consistent with crohns disease.  Discussed with patient at length today as well as potentially treatment options for this.  Is understandably not ready to make a decision regarding therapy at present and  given that he feels well there is certainly time.  For now - will get MRE to better assess for any small bowel involvement.  Will also get baseline fecal calprotectin and pre-biologic labs.  Generally in levi-anal disease would advocate for anti-TNF agent but given his somewhat mild presentation, an [id number] agent maybe reasonable as well. Discussed risk/benefits of both with patient today. Also referred to ccfa.org.  After MRE and labs are completed will follow-up to come up with a treatment plan    RTC 4 weeks    Afshan Stark, DO     Video-Visit Details     Type of service:  Video Visit          Mode of Communication:  Video Conference via [employer]

## 2023-11-01 NOTE — NURSING NOTE
Is the patient currently in the state of [place]? YES    Visit mode:VIDEO    If the visit is dropped, the patient can be reconnected by: VIDEO VISIT: Text to cell phone:   Telephone Information:   Mobile [phone]       Will anyone else be joining the visit? NO  (If patient encounters technical issues they should call [phone])    How would you like to obtain your AVS? MyChart    Are changes needed to the allergy or medication list? Pt stated no changes to allergies and Pt stated no med changes    Reason for visit: Consult    Claudia MUNIZF

## 2023-11-01 NOTE — PROGRESS NOTES
"Virtual Visit Details    Type of service:  Video Visit     Originating Location (pt. Location): {video visit patient location:632771::\"Home\"}  {PROVIDER LOCATION On-site should be selected for visits conducted from your clinic location or adjoining [facility] hospital, academic office, or other nearby [facility] building. Off-site should be selected for all other provider locations, including home:[id number]}  Distant Location (provider location):  {virtual location provider:508223}  Platform used for Video Visit: {Virtual Visit Platforms:292339::\"Art[employer]inder\"}  "

## 2023-11-01 NOTE — PATIENT INSTRUCTIONS
Please have blood work done and submit a stool sample.  Please schedule an MR enterography.    The crohns and colitis foundation (ccfa.org) is an excellent resource for patients with IBD and has information on all the medications we discussed today. My recommendations for you would likely be remicade (infliximab) or skyrizi (risankizumab) or stelara (ustekinumab).    Please reach out to me with any questions or concerns

## 2024-12-15 ENCOUNTER — HEALTH MAINTENANCE LETTER (OUTPATIENT)
Age: 39
End: 2024-12-15

## (undated) RX ORDER — SIMETHICONE 40MG/0.6ML
SUSPENSION, DROPS(FINAL DOSAGE FORM)(ML) ORAL
Status: DISPENSED
Start: 2023-10-25

## (undated) RX ORDER — FENTANYL CITRATE 50 UG/ML
INJECTION, SOLUTION INTRAMUSCULAR; INTRAVENOUS
Status: DISPENSED
Start: 2023-10-25